# Patient Record
(demographics unavailable — no encounter records)

---

## 2025-03-28 NOTE — ASSESSMENT
[FreeTextEntry1] : Patient is a 28yo F with history of recurrent gallstone pancreatitis peripartum requiring lap cholecystectomy with IOC, recovering well

## 2025-03-28 NOTE — PHYSICAL EXAM
[de-identified] : NAD [de-identified] : NC/AT [de-identified] : supple  [de-identified] : non-labored breathing or conversational dyspnea [de-identified] : non distended soft and depressible, non-tender to palpation. Incision healing well, no signs of infection.

## 2025-03-28 NOTE — HISTORY OF PRESENT ILLNESS
[de-identified] : Patient is a 26yo F with history of recurrent gallstone pancreatitis peripartum requiring lap cholecystectomy with IOC.  [de-identified] : Patient comes for follow up and report she has been recovering well, pain is well controlled. Mentions some pain with certain movements. Tolerating diet sometimes has mild nausea but no emesis. Patient also endorses lightheadedness however mentions she is still having post-partum bleeding.

## 2025-04-07 NOTE — HISTORY OF PRESENT ILLNESS
[Postpartum Follow Up] : postpartum follow up [Last Pap Date: ___] : Last Pap Date: [unfilled] [Delivery Date: ___] : on [unfilled] [Repeat C/S] : delivered by  section (repeat) [Female] : Delivery History: baby girl [Discharge HCT: ___] : hematocrit level was [unfilled] [Discharge HGB: ___] : hemoglobin level was [unfilled] [Resumed [place]] : has resumed intercourse [Intended Contraception] : Intended Contraception: [IUD] : intrauterine device [Wt. ___] : weighing [unfilled] [Breastfeeding] : currently nursing [Complications:___] : no complications [Rhogam] : Rhogam was not administered [Rubella Vaccine] : Rubella vaccine was not administered [BF with Difficulty] : nursing without difficulty [Resumed Menses] : has not resumed her menses [Clean/Dry/Intact] : clean, dry and intact [Erythema] : not erythematous [Swelling] : not swollen [Dehiscence] : not dehisced [None] : no vaginal bleeding [Normal] : the vagina was normal [Examination Of The Breasts] : breasts are normal [Doing Well] : is doing well [No Sign of Infection] : is showing no signs of infection [No [place]] : to avoid sexual intercourse [Limited ADLs] : to participate in activities of daily living with limitations [No Work] : not to work [No Housework] : not to do housework [No Sports] : not to participate in sports [FreeTextEntry8] : post op 5.4 wks [FreeTextEntry9] : late onset GDM  [de-identified] : repeat C/S / lap cholecystectomy [de-identified] : repeat C/S 5.4wks, incision CDI, emergency lap/cholecystectomy during PP period, stable and recovering well [de-identified] : follow up 2 wks for IUD insertion

## 2025-04-07 NOTE — HISTORY OF PRESENT ILLNESS
[Postpartum Follow Up] : postpartum follow up [Last Pap Date: ___] : Last Pap Date: [unfilled] [Delivery Date: ___] : on [unfilled] [Repeat C/S] : delivered by  section (repeat) [Female] : Delivery History: baby girl [Discharge HCT: ___] : hematocrit level was [unfilled] [Discharge HGB: ___] : hemoglobin level was [unfilled] [Resumed [place]] : has resumed intercourse [Intended Contraception] : Intended Contraception: [IUD] : intrauterine device [Wt. ___] : weighing [unfilled] [Breastfeeding] : currently nursing [Complications:___] : no complications [Rhogam] : Rhogam was not administered [Rubella Vaccine] : Rubella vaccine was not administered [BF with Difficulty] : nursing without difficulty [Resumed Menses] : has not resumed her menses [Clean/Dry/Intact] : clean, dry and intact [Erythema] : not erythematous [Swelling] : not swollen [Dehiscence] : not dehisced [None] : no vaginal bleeding [Normal] : the vagina was normal [Examination Of The Breasts] : breasts are normal [Doing Well] : is doing well [No Sign of Infection] : is showing no signs of infection [No [place]] : to avoid sexual intercourse [Limited ADLs] : to participate in activities of daily living with limitations [No Work] : not to work [No Housework] : not to do housework [No Sports] : not to participate in sports [FreeTextEntry8] : post op 5.4 wks [FreeTextEntry9] : late onset GDM  [de-identified] : repeat C/S / lap cholecystectomy [de-identified] : repeat C/S 5.4wks, incision CDI, emergency lap/cholecystectomy during PP period, stable and recovering well [de-identified] : follow up 2 wks for IUD insertion

## 2025-04-24 NOTE — PROCEDURE
[IUD Placement] : intrauterine device (IUD) placement [Consent Obtained] : Consent obtained [Prevention of Pregnancy] : prevention of pregnancy [Risks] : risks [Benefits] : benefits [Alternatives] : alternatives [Patient] : patient [Infection] : infection [Bleeding] : bleeding [Pain] : pain [Expulsion] : expulsion [Failure] : failure [Uterine Perforation] : uterine perforation [Neg Pregnancy Test] : negative pregnancy test [Neg GC/Chlamydia] : negative GC/Chlamydia [No Premedication] : No premedication [Tenaculum] : Tenaculum [Easy Passage] : Easy passage [Sounded to ___ cm] : sounded to [unfilled] ~Ucm [ParaGard IUD] : ParaGard IUD [Tolerated Well] : Patient tolerated the procedure well [No Complications] : No complications [None] : None [LMPDate] : N/A [de-identified] : [id number] [de-identified] : 7/20/2027 [de-identified] : 4/2035

## 2025-04-24 NOTE — PROCEDURE
[IUD Placement] : intrauterine device (IUD) placement [Consent Obtained] : Consent obtained [Prevention of Pregnancy] : prevention of pregnancy [Risks] : risks [Benefits] : benefits [Alternatives] : alternatives [Patient] : patient [Infection] : infection [Bleeding] : bleeding [Pain] : pain [Expulsion] : expulsion [Failure] : failure [Uterine Perforation] : uterine perforation [Neg Pregnancy Test] : negative pregnancy test [Neg GC/Chlamydia] : negative GC/Chlamydia [No Premedication] : No premedication [Tenaculum] : Tenaculum [Easy Passage] : Easy passage [Sounded to ___ cm] : sounded to [unfilled] ~Ucm [ParaGard IUD] : ParaGard IUD [Tolerated Well] : Patient tolerated the procedure well [No Complications] : No complications [None] : None [LMPDate] : N/A [de-identified] : [id number] [de-identified] : 7/20/2027 [de-identified] : 4/2035

## 2025-04-24 NOTE — PLAN
[FreeTextEntry1] : s/s post opp infection explained Ibuprofen if needed 3 day pelvic rest/recoery 2 month revisit to check presentation

## 2025-04-24 NOTE — PROCEDURE
[IUD Placement] : intrauterine device (IUD) placement [Consent Obtained] : Consent obtained [Prevention of Pregnancy] : prevention of pregnancy [Risks] : risks [Benefits] : benefits [Alternatives] : alternatives [Patient] : patient [Infection] : infection [Bleeding] : bleeding [Pain] : pain [Expulsion] : expulsion [Failure] : failure [Uterine Perforation] : uterine perforation [Neg Pregnancy Test] : negative pregnancy test [Neg GC/Chlamydia] : negative GC/Chlamydia [No Premedication] : No premedication [Tenaculum] : Tenaculum [Easy Passage] : Easy passage [Sounded to ___ cm] : sounded to [unfilled] ~Ucm [ParaGard IUD] : ParaGard IUD [Tolerated Well] : Patient tolerated the procedure well [No Complications] : No complications [None] : None [LMPDate] : N/A [de-identified] : [id number] [de-identified] : 7/20/2027 [de-identified] : 4/2035

## 2025-06-23 NOTE — PHYSICAL EXAM
[Labia Majora] : normal [Normal] : normal [IUD String] : an IUD string was noted [FreeTextEntry5] : strings visible at cervix entrance [FreeTextEntry6] : IUD difficult to see with TA darrello

## 2025-06-23 NOTE — PLAN
[FreeTextEntry1] : radiology US appt upcoming on 6/28 Neuro referral for rule out migraines, frequent HAs Vaginitis swab obtained for increased discharge follow up after US

## 2025-06-23 NOTE — HISTORY OF PRESENT ILLNESS
[FreeTextEntry1] : Pt. is here for IUD check Paragard placed 4/23/25, reports 1 light menstrual cycle UCG neg Bedside US TA IUD appeared lower in the uterus but due to maternal habitus recommend radiology US  Pt also reports unilateral HA ~ 3x week [PapSmeardate] : 9/11/24 [TextBox_31] : neg

## 2025-06-23 NOTE — COUNSELING
She is in a lot of pain today with UTI sxs . Can you treat? [facility] on file. Allergies to Codeine and Latex   [Other ___] : [unfilled]

## 2025-07-18 NOTE — PLAN
[FreeTextEntry1] : referred to radiology to confirm IUD correct placement follow up after US for result

## 2025-07-18 NOTE — PROCEDURE
[IUD Placement] : intrauterine device (IUD) placement [Consent Obtained] : Consent obtained [Prevention of Pregnancy] : prevention of pregnancy [Alternatives] : alternatives [Infection] : infection [Bleeding] : bleeding [Pain] : pain [Expulsion] : expulsion [Failure] : failure [Uterine Perforation] : uterine perforation [Neg Pregnancy Test] : negative pregnancy test [Neg GC/Chlamydia] : negative GC/Chlamydia [History of Unprotected [place]] : no history of unprotected intercourse [No Premedication] : No premedication [Tenaculum] : Tenaculum [Easy Passage] : Easy passage [Sounded to ___ cm] : sounded to [unfilled] ~Ucm [ParaGard IUD] : ParaGard IUD [None] : None [IUD Removal] : intrauterine device (IUD) removal [Risks] : risks [Benefits] : benefits [Patient] : patient [Speculum Placed] : speculum placed [Nonvisualization of Strings] : nonvisualization of strings [IUD Discarded] : IUD discarded [Tolerated Well] : Patient tolerated the procedure well [No Complications] : no complications [LMPDate] : 6/10/2025 [de-identified] : referred for radiology US, difficult to visualize placement due to maternal habitus [de-identified] : [id number] [de-identified] : 8/2027 [de-identified] : 7/2035 [de-identified] : bleeding moderate from tenaculum placement at 2:00, resolved with Monsels [de-identified] : IUD hook used to expose strings